# Patient Record
Sex: MALE | Race: WHITE | NOT HISPANIC OR LATINO | Employment: OTHER | URBAN - NONMETROPOLITAN AREA
[De-identification: names, ages, dates, MRNs, and addresses within clinical notes are randomized per-mention and may not be internally consistent; named-entity substitution may affect disease eponyms.]

---

## 2017-08-24 ENCOUNTER — OFFICE VISIT (OUTPATIENT)
Dept: CARDIOLOGY | Facility: CLINIC | Age: 72
End: 2017-08-24

## 2017-08-24 VITALS
HEART RATE: 107 BPM | WEIGHT: 268.2 LBS | OXYGEN SATURATION: 98 % | HEIGHT: 68 IN | SYSTOLIC BLOOD PRESSURE: 134 MMHG | BODY MASS INDEX: 40.65 KG/M2 | DIASTOLIC BLOOD PRESSURE: 67 MMHG

## 2017-08-24 DIAGNOSIS — R06.02 SHORTNESS OF BREATH: ICD-10-CM

## 2017-08-24 DIAGNOSIS — R07.2 PRECORDIAL PAIN: Primary | ICD-10-CM

## 2017-08-24 DIAGNOSIS — I25.119 CORONARY ARTERY DISEASE INVOLVING NATIVE CORONARY ARTERY OF NATIVE HEART WITH ANGINA PECTORIS (HCC): ICD-10-CM

## 2017-08-24 DIAGNOSIS — I10 ESSENTIAL HYPERTENSION: ICD-10-CM

## 2017-08-24 PROCEDURE — 99204 OFFICE O/P NEW MOD 45 MIN: CPT | Performed by: PHYSICIAN ASSISTANT

## 2017-08-24 RX ORDER — NITROGLYCERIN 0.4 MG/1
TABLET SUBLINGUAL
Qty: 100 TABLET | Refills: 11 | Status: SHIPPED | OUTPATIENT
Start: 2017-08-24 | End: 2017-09-12 | Stop reason: SDUPTHER

## 2017-08-24 RX ORDER — CHLORTHALIDONE 25 MG/1
25 TABLET ORAL DAILY
COMMUNITY

## 2017-08-24 RX ORDER — ATORVASTATIN CALCIUM 40 MG/1
40 TABLET, FILM COATED ORAL DAILY
COMMUNITY

## 2017-08-24 RX ORDER — HYDROCODONE BITARTRATE AND ACETAMINOPHEN 7.5; 325 MG/1; MG/1
1 TABLET ORAL EVERY 6 HOURS PRN
COMMUNITY

## 2017-08-24 RX ORDER — LISINOPRIL 20 MG/1
20 TABLET ORAL DAILY
COMMUNITY

## 2017-08-24 RX ORDER — AMLODIPINE BESYLATE 10 MG/1
10 TABLET ORAL DAILY
COMMUNITY
Start: 2013-09-17

## 2017-08-24 RX ORDER — SPIRONOLACTONE 50 MG/1
50 TABLET, FILM COATED ORAL DAILY
COMMUNITY

## 2017-08-24 RX ORDER — ISOSORBIDE DINITRATE 30 MG/1
30 TABLET ORAL DAILY
COMMUNITY

## 2017-08-24 RX ORDER — FINASTERIDE 5 MG/1
5 TABLET, FILM COATED ORAL DAILY
COMMUNITY

## 2017-08-24 RX ORDER — ALLOPURINOL 100 MG/1
100 TABLET ORAL DAILY
COMMUNITY

## 2017-08-24 RX ORDER — TERAZOSIN 10 MG/1
10 CAPSULE ORAL NIGHTLY
COMMUNITY

## 2017-08-24 RX ORDER — CETIRIZINE HYDROCHLORIDE 10 MG/1
10 TABLET ORAL DAILY
COMMUNITY
Start: 2013-09-17

## 2017-08-24 RX ORDER — GALANTAMINE HYDROBROMIDE 8 MG/1
16 TABLET, FILM COATED ORAL DAILY
COMMUNITY
Start: 2013-09-17

## 2017-08-24 RX ORDER — CHOLECALCIFEROL (VITAMIN D3) 25 MCG
CAPSULE ORAL DAILY
COMMUNITY
Start: 2013-09-17

## 2017-08-24 NOTE — PROGRESS NOTES
"Subjective   Arnie Hyatt is a 72 y.o. male     Chief Complaint   Patient presents with   • Chest Pain     presents as a new patient, patient is having chest pain   • Shortness of Breath     extreme sob with excertion    Problem List:  1.  Coronary artery disease  1.1.  History of stenting, remote, 2001.  We do not have a catheter report available for anatomy.  1.2.  Repeat catheterization, 2008, which indicated nonobstructive coronary artery disease.  Medical management was recommended.  2.  Preserved systolic function  3.  Hypertension  4.  Dyslipidemia    HPI    The patient presents back for evaluation.  We have not seen him since 2013.  We had seen him in the setting of coronary artery disease at that time.  He was scheduled for stress test and an echo at that time which, by review, otherwise unremarkable.  The patient had done well up until the past several weeks.  He is started noticing increasing dyspnea.  He now has shortness of air which limits her transit from his bathroom to his living room.  He must rest almost immediately.  He has now started getting chest tightness with that.  Symptoms resolve after resting several minutes.  He has had no PND orthopnea.  He has had no rhythm disturbance issues.  Blood pressures been reasonably well-controlled.  The patient relates no further complaints otherwise at this time.  Because of severe shortness of air, we have been asked to see him.  Of note, he has started seeing nephrology recently because of azotemia/chronic renal insufficiency.  He advises today that he has \"20% kidney function\".    Current Outpatient Prescriptions   Medication Sig Dispense Refill   • allopurinol (ZYLOPRIM) 100 MG tablet Take 100 mg by mouth Daily.     • amLODIPine (NORVASC) 10 MG tablet Take 10 mg by mouth Daily.     • aspirin 81 MG tablet Take  by mouth Daily.     • atorvastatin (LIPITOR) 40 MG tablet Take 40 mg by mouth Daily. 1/2 tablet daily     • cetirizine (zyrTEC) 10 MG tablet " Take 10 mg by mouth Daily.     • chlorthalidone (HYGROTON) 25 MG tablet Take 25 mg by mouth Daily.     • Cholecalciferol (VITAMIN D-3) 1000 units capsule Take  by mouth Daily.     • cyanocobalamin 100 MCG tablet Take  by mouth Daily.     • finasteride (PROSCAR) 5 MG tablet Take 5 mg by mouth Daily.     • galantamine (RAZADYNE) 8 MG tablet Take 16 mg by mouth Daily.     • HYDROcodone-acetaminophen (NORCO) 7.5-325 MG per tablet Take 1 tablet by mouth Every 6 (Six) Hours As Needed for Moderate Pain  (1/2 tablet).     • insulin NPH (humuLIN N,novoLIN N) 100 UNIT/ML injection Inject  under the skin 2 (Two) Times a Day Before Meals.     • isosorbide dinitrate (ISORDIL) 30 MG tablet Take 30 mg by mouth Daily.     • lisinopril (PRINIVIL,ZESTRIL) 20 MG tablet Take 20 mg by mouth Daily.     • spironolactone (ALDACTONE) 50 MG tablet Take 50 mg by mouth Daily.     • terazosin (HYTRIN) 10 MG capsule Take 10 mg by mouth Every Night.     • nitroglycerin (NITROSTAT) 0.4 MG SL tablet 1 under the tongue as needed for angina, may repeat q5mins for up three doses 100 tablet 11     No current facility-administered medications for this visit.        Contrast dye    Past Medical History:   Diagnosis Date   • Diabetes mellitus    • Hyperlipidemia    • Hypertension    • Kidney failure     patient states he has 20% percent function        Social History     Social History   • Marital status:      Spouse name: N/A   • Number of children: N/A   • Years of education: N/A     Occupational History   • Not on file.     Social History Main Topics   • Smoking status: Former Smoker   • Smokeless tobacco: Never Used   • Alcohol use No   • Drug use: No   • Sexual activity: Defer     Other Topics Concern   • Not on file     Social History Narrative   • No narrative on file       Family History   Problem Relation Age of Onset   • No Known Problems Mother    • No Known Problems Father        Review of Systems   Constitutional: Positive for fatigue  "( can not walk very far without getting extremely tired ).   HENT: Negative.    Eyes: Positive for visual disturbance (wears glasses).   Respiratory: Positive for shortness of breath (extreme shortness of breath ).    Cardiovascular: Positive for chest pain, palpitations (patient states that heart pounds hard ) and leg swelling.   Gastrointestinal: Negative.    Endocrine: Negative.    Genitourinary: Positive for difficulty urinating.   Musculoskeletal: Positive for arthralgias.   Skin: Negative.    Allergic/Immunologic: Negative.    Neurological: Negative.    Hematological: Negative.    Psychiatric/Behavioral: Negative.        Objective     /67 (BP Location: Left arm, Patient Position: Sitting)  Pulse 107  Ht 68\" (172.7 cm)  Wt 268 lb 3.2 oz (122 kg)  SpO2 98%  BMI 40.78 kg/m2    Lab Results (most recent)     None          Physical Exam   Constitutional: He is oriented to person, place, and time. He appears well-developed and well-nourished. No distress.   HENT:   Head: Normocephalic and atraumatic.   Eyes: Conjunctivae and EOM are normal. Pupils are equal, round, and reactive to light.   Neck: Normal range of motion. Neck supple. No JVD present. No tracheal deviation present.   Cardiovascular: Normal rate, regular rhythm, normal heart sounds and intact distal pulses.    Pulmonary/Chest: Effort normal and breath sounds normal.   Abdominal: Soft. Bowel sounds are normal. He exhibits no distension and no mass. There is no tenderness. There is no rebound and no guarding.   Musculoskeletal: Normal range of motion. He exhibits no edema, tenderness or deformity.   Neurological: He is alert and oriented to person, place, and time.   Skin: Skin is warm and dry. No rash noted. No erythema. No pallor.   Psychiatric: He has a normal mood and affect. His behavior is normal. Judgment and thought content normal.   Nursing note and vitals reviewed.      Procedure   Procedures         Assessment/Plan      Diagnosis Plan "   1. Precordial pain  Stress Test With Myocardial Perfusion One Day    Adult Transthoracic Echo Complete    Stress Test With Myocardial Perfusion One Day    Adult Transthoracic Echo Complete   2. Shortness of breath  Stress Test With Myocardial Perfusion One Day    Adult Transthoracic Echo Complete    Stress Test With Myocardial Perfusion One Day    Adult Transthoracic Echo Complete   3. Coronary artery disease involving native coronary artery of native heart with angina pectoris     4. Essential hypertension       I would like to schedule the patient for expedited stress test and echocardiogram.  I've asked nursing staff to get him an appointment for stress test and echocardiogram as soon as possible.  For now, I will continue medications.  I did give him nitroglycerin.  I'm concerned about his clinical course and symptoms.  I'm also concerned however given his renal function.  If catheterization is indicated, he will obviously we'll have to have renal prophylaxis prior to that procedure.  For any continued or certainly progressive symptoms, I asked that he go on to the emergency room for consideration of admission and inpatient evaluation on an expedited basis.  We'll see him as soon as we have results of those above available recommend further to him at that time.  I have asked that he decrease his activity/exertion around his house until we know what testing is as above.

## 2017-08-28 ENCOUNTER — TELEPHONE (OUTPATIENT)
Dept: CARDIOLOGY | Facility: CLINIC | Age: 72
End: 2017-08-28

## 2017-08-28 NOTE — TELEPHONE ENCOUNTER
----- Message from Keke Rosado sent at 8/28/2017 12:48 PM EDT -----  Contact: PATIENT  THE PATIENT STATES HE IS NOT ABLE TO WALK OVER 50 FT HE IS EXTREMELY OUT OF BREATH AND HIS HEART IS RUNNING AWAY.  SHE STATES HIS PCP WANTED HIM TO GO TO THE HOSPITAL BUT HE IS WAITING ON TESTING FROM US.  HE CAN BE REACHED [phone].  THANKS      Per Kayden Miles PA-C will need to get patient's testing scheduled soon. Called patient and he states he is in the ER now for Evaluation. Instructed patient to call our office and let us know what the ER finds.

## 2017-09-06 ENCOUNTER — HOSPITAL ENCOUNTER (OUTPATIENT)
Dept: CARDIOLOGY | Facility: HOSPITAL | Age: 72
Discharge: HOME | End: 2017-09-06

## 2017-09-06 ENCOUNTER — OUTSIDE FACILITY SERVICE (OUTPATIENT)
Dept: CARDIOLOGY | Facility: CLINIC | Age: 72
End: 2017-09-06

## 2017-09-06 LAB
MAXIMAL PREDICTED HEART RATE: 150 BPM
STRESS TARGET HR: 128 BPM

## 2017-09-06 PROCEDURE — 93306 TTE W/DOPPLER COMPLETE: CPT

## 2017-09-06 PROCEDURE — 93018 CV STRESS TEST I&R ONLY: CPT | Performed by: INTERNAL MEDICINE

## 2017-09-06 PROCEDURE — 93306 TTE W/DOPPLER COMPLETE: CPT | Performed by: INTERNAL MEDICINE

## 2017-09-06 PROCEDURE — A9500 TC99M SESTAMIBI: HCPCS | Performed by: INTERNAL MEDICINE

## 2017-09-06 PROCEDURE — 78452 HT MUSCLE IMAGE SPECT MULT: CPT

## 2017-09-06 PROCEDURE — 93017 CV STRESS TEST TRACING ONLY: CPT

## 2017-09-06 PROCEDURE — 25010000002 REGADENOSON 0.4 MG/5ML SOLUTION: Performed by: INTERNAL MEDICINE

## 2017-09-06 PROCEDURE — 0 TECHNETIUM SESTAMIBI: Performed by: INTERNAL MEDICINE

## 2017-09-06 PROCEDURE — 78452 HT MUSCLE IMAGE SPECT MULT: CPT | Performed by: INTERNAL MEDICINE

## 2017-09-06 RX ADMIN — TECHNETIUM TC-99M SESTAMIBI 1 DOSE: 1 INJECTION INTRAVENOUS at 11:00

## 2017-09-06 RX ADMIN — REGADENOSON 0.4 MG: 0.08 INJECTION, SOLUTION INTRAVENOUS at 11:00

## 2017-09-11 ENCOUNTER — DOCUMENTATION (OUTPATIENT)
Dept: CARDIOLOGY | Facility: CLINIC | Age: 72
End: 2017-09-11

## 2017-09-12 ENCOUNTER — OFFICE VISIT (OUTPATIENT)
Dept: CARDIOLOGY | Facility: CLINIC | Age: 72
End: 2017-09-12

## 2017-09-12 VITALS
WEIGHT: 267.2 LBS | HEART RATE: 86 BPM | BODY MASS INDEX: 40.5 KG/M2 | DIASTOLIC BLOOD PRESSURE: 77 MMHG | OXYGEN SATURATION: 98 % | SYSTOLIC BLOOD PRESSURE: 144 MMHG | HEIGHT: 68 IN

## 2017-09-12 DIAGNOSIS — I25.119 CORONARY ARTERY DISEASE INVOLVING NATIVE CORONARY ARTERY OF NATIVE HEART WITH ANGINA PECTORIS (HCC): Primary | ICD-10-CM

## 2017-09-12 DIAGNOSIS — R06.02 SHORTNESS OF BREATH: ICD-10-CM

## 2017-09-12 DIAGNOSIS — R07.2 PRECORDIAL PAIN: ICD-10-CM

## 2017-09-12 PROCEDURE — 99213 OFFICE O/P EST LOW 20 MIN: CPT | Performed by: PHYSICIAN ASSISTANT

## 2017-09-12 RX ORDER — NITROGLYCERIN 0.4 MG/1
TABLET SUBLINGUAL
Qty: 25 TABLET | Refills: 2 | Status: SHIPPED | OUTPATIENT
Start: 2017-09-12

## 2017-09-12 NOTE — PROGRESS NOTES
Problem list     Subjective   Arnie Hyatt is a 72 y.o. male     Chief Complaint   Patient presents with   • Hypertension     presents as a follow up   • Chest Pain   Problem List:  1.  Coronary artery disease  1.1.  History of stenting, remote, 2001.  We do not have a catheter report available for anatomy.  1.2.  Repeat catheterization, 2008, which indicated nonobstructive coronary artery disease.  Medical management was recommended.  2.  Preserved systolic function  3.  Hypertension  4.  Dyslipidemia       HPI  The patient presents in today for follow-up for stress and echo findings.  We had seen him previously because of chest tightness, shortness of air, and fatigue.  He reports that his exercise capacity has been virtually 0 because of limitation with the symptoms.  We did schedule for stress test and an echo.  Stress indicated no ischemia.  Echo indicated preserved systolic function with no significant valvular issues.  We wanted to get him back today just to evaluate symptoms.  He reports he might be a little better since discontinuing labetalol and Proscar.  He still is very symptomatic with chest tightness and shortness of air.  He has virtually no exercise capacity still.  [facility] and then the following week for consideration of workup of noncardiac etiologies of chest pain.  He has no further complaints otherwise.    Current Outpatient Prescriptions   Medication Sig Dispense Refill   • allopurinol (ZYLOPRIM) 100 MG tablet Take 100 mg by mouth Daily.     • amLODIPine (NORVASC) 10 MG tablet Take 10 mg by mouth Daily.     • aspirin 81 MG tablet Take  by mouth Daily.     • atorvastatin (LIPITOR) 40 MG tablet Take 40 mg by mouth Daily. 1/2 tablet daily     • cetirizine (zyrTEC) 10 MG tablet Take 10 mg by mouth Daily.     • chlorthalidone (HYGROTON) 25 MG tablet Take 25 mg by mouth Daily.     • Cholecalciferol (VITAMIN D-3) 1000 units capsule Take  by mouth Daily.      • cyanocobalamin 100 MCG tablet Take  by mouth Daily.     • finasteride (PROSCAR) 5 MG tablet Take 5 mg by mouth Daily.     • galantamine (RAZADYNE) 8 MG tablet Take 16 mg by mouth Daily.     • HYDROcodone-acetaminophen (NORCO) 7.5-325 MG per tablet Take 1 tablet by mouth Every 6 (Six) Hours As Needed for Moderate Pain  (1/2 tablet).     • insulin NPH (humuLIN N,novoLIN N) 100 UNIT/ML injection Inject  under the skin 2 (Two) Times a Day Before Meals.     • isosorbide dinitrate (ISORDIL) 30 MG tablet Take 30 mg by mouth Daily.     • lisinopril (PRINIVIL,ZESTRIL) 20 MG tablet Take 20 mg by mouth Daily.     • nitroglycerin (NITROSTAT) 0.4 MG SL tablet 1 under the tongue as needed for angina, may repeat q5mins for up three doses 25 tablet 2   • spironolactone (ALDACTONE) 50 MG tablet Take 50 mg by mouth Daily.     • terazosin (HYTRIN) 10 MG capsule Take 10 mg by mouth Every Night.       No current facility-administered medications for this visit.        Contrast dye    Past Medical History:   Diagnosis Date   • Diabetes mellitus    • Hyperlipidemia    • Hypertension    • Kidney failure     patient states he has 20% percent function        Social History     Social History   • Marital status:      Spouse name: N/A   • Number of children: N/A   • Years of education: N/A     Occupational History   • Not on file.     Social History Main Topics   • Smoking status: Former Smoker   • Smokeless tobacco: Never Used   • Alcohol use No   • Drug use: No   • Sexual activity: Defer     Other Topics Concern   • Not on file     Social History Narrative       Family History   Problem Relation Age of Onset   • No Known Problems Mother    • No Known Problems Father        Review of Systems   Constitutional: Negative.    HENT: Negative.    Eyes: Negative.    Respiratory: Positive for shortness of breath.    Cardiovascular: Positive for chest pain and palpitations.   Gastrointestinal: Negative.    Endocrine: Negative.   "  Genitourinary: Negative.    Musculoskeletal: Negative.    Skin: Negative.    Allergic/Immunologic: Negative.    Neurological: Negative.    Hematological: Negative.    Psychiatric/Behavioral: Negative.        Objective   /77 (BP Location: Left arm, Patient Position: Sitting)  Pulse 86  Ht 68\" (172.7 cm)  Wt 267 lb 3.2 oz (121 kg)  SpO2 98%  BMI 40.63 kg/m2  Lab Results (most recent)     None        Physical Exam   Constitutional: He is oriented to person, place, and time. He appears well-developed and well-nourished. No distress.   HENT:   Head: Normocephalic and atraumatic.   Eyes: Conjunctivae and EOM are normal. Pupils are equal, round, and reactive to light.   Neck: Normal range of motion. Neck supple. No JVD present. No tracheal deviation present.   Cardiovascular: Normal rate, regular rhythm, normal heart sounds and intact distal pulses.    Pulmonary/Chest: Effort normal and breath sounds normal.   Abdominal: Soft. Bowel sounds are normal. He exhibits no distension and no mass. There is no tenderness. There is no rebound and no guarding.   Musculoskeletal: Normal range of motion. He exhibits no edema, tenderness or deformity.   Neurological: He is alert and oriented to person, place, and time.   Skin: Skin is warm and dry. No rash noted. No erythema. No pallor.   Psychiatric: He has a normal mood and affect. His behavior is normal. Judgment and thought content normal.   Nursing note and vitals reviewed.        Procedure   Procedures       Assessment/Plan      Diagnosis Plan   1. Coronary artery disease involving native coronary artery of native heart with angina pectoris     2. Precordial pain     3. Shortness of breath         The patient stress test and echo were unremarkable, as above.  [facility] etiologies of his symptoms.  We have discussed consideration for catheterization given the severity of his symptoms.  I'm concerned with his " baseline renal status however.  I would like to exclude noncardiac etiologies of discomfort/symptoms.  If needed, we can give him renal prophylactic protocol prior to catheterization.  I would still would like to hold on that for now.  For worsened symptoms, he will call to us.